# Patient Record
Sex: MALE | Race: WHITE | NOT HISPANIC OR LATINO | URBAN - METROPOLITAN AREA
[De-identification: names, ages, dates, MRNs, and addresses within clinical notes are randomized per-mention and may not be internally consistent; named-entity substitution may affect disease eponyms.]

---

## 2018-12-21 ENCOUNTER — EMERGENCY (EMERGENCY)
Facility: HOSPITAL | Age: 28
LOS: 0 days | Discharge: HOME | End: 2018-12-21
Attending: EMERGENCY MEDICINE | Admitting: EMERGENCY MEDICINE

## 2018-12-21 VITALS
OXYGEN SATURATION: 99 % | RESPIRATION RATE: 20 BRPM | HEART RATE: 115 BPM | WEIGHT: 184.97 LBS | DIASTOLIC BLOOD PRESSURE: 72 MMHG | SYSTOLIC BLOOD PRESSURE: 133 MMHG | HEIGHT: 70 IN | TEMPERATURE: 101 F

## 2018-12-21 VITALS
OXYGEN SATURATION: 95 % | SYSTOLIC BLOOD PRESSURE: 108 MMHG | DIASTOLIC BLOOD PRESSURE: 56 MMHG | RESPIRATION RATE: 18 BRPM | HEART RATE: 96 BPM | TEMPERATURE: 99 F

## 2018-12-21 DIAGNOSIS — R19.7 DIARRHEA, UNSPECIFIED: ICD-10-CM

## 2018-12-21 DIAGNOSIS — R50.9 FEVER, UNSPECIFIED: ICD-10-CM

## 2018-12-21 DIAGNOSIS — J45.909 UNSPECIFIED ASTHMA, UNCOMPLICATED: ICD-10-CM

## 2018-12-21 DIAGNOSIS — R10.9 UNSPECIFIED ABDOMINAL PAIN: ICD-10-CM

## 2018-12-21 DIAGNOSIS — R11.2 NAUSEA WITH VOMITING, UNSPECIFIED: ICD-10-CM

## 2018-12-21 LAB
ALBUMIN SERPL ELPH-MCNC: 3.5 G/DL — SIGNIFICANT CHANGE UP (ref 3.5–5.2)
ALP SERPL-CCNC: 42 U/L — SIGNIFICANT CHANGE UP (ref 30–115)
ALT FLD-CCNC: 16 U/L — SIGNIFICANT CHANGE UP (ref 0–41)
ANION GAP SERPL CALC-SCNC: 11 MMOL/L — SIGNIFICANT CHANGE UP (ref 7–14)
AST SERPL-CCNC: 13 U/L — SIGNIFICANT CHANGE UP (ref 0–41)
BASE EXCESS BLDV CALC-SCNC: 1 MMOL/L — SIGNIFICANT CHANGE UP (ref -2–2)
BILIRUB SERPL-MCNC: 0.7 MG/DL — SIGNIFICANT CHANGE UP (ref 0.2–1.2)
BUN SERPL-MCNC: 14 MG/DL — SIGNIFICANT CHANGE UP (ref 10–20)
CA-I SERPL-SCNC: 1.1 MMOL/L — LOW (ref 1.12–1.3)
CALCIUM SERPL-MCNC: 7.3 MG/DL — LOW (ref 8.5–10.1)
CHLORIDE SERPL-SCNC: 105 MMOL/L — SIGNIFICANT CHANGE UP (ref 98–110)
CO2 SERPL-SCNC: 23 MMOL/L — SIGNIFICANT CHANGE UP (ref 17–32)
CREAT SERPL-MCNC: 0.7 MG/DL — SIGNIFICANT CHANGE UP (ref 0.7–1.5)
GAS PNL BLDV: 136 MMOL/L — SIGNIFICANT CHANGE UP (ref 136–145)
GAS PNL BLDV: SIGNIFICANT CHANGE UP
GLUCOSE SERPL-MCNC: 98 MG/DL — SIGNIFICANT CHANGE UP (ref 70–99)
HCO3 BLDV-SCNC: 26 MMOL/L — SIGNIFICANT CHANGE UP (ref 22–29)
HCT VFR BLDA CALC: 45 % — HIGH (ref 34–44)
HGB BLD CALC-MCNC: 14.7 G/DL — SIGNIFICANT CHANGE UP (ref 14–18)
HOROWITZ INDEX BLDV+IHG-RTO: 21 — SIGNIFICANT CHANGE UP
LACTATE BLDV-MCNC: 1.5 MMOL/L — SIGNIFICANT CHANGE UP (ref 0.5–1.6)
LIDOCAIN IGE QN: 17 U/L — SIGNIFICANT CHANGE UP (ref 7–60)
PCO2 BLDV: 42 MMHG — SIGNIFICANT CHANGE UP (ref 41–51)
PH BLDV: 7.4 — SIGNIFICANT CHANGE UP (ref 7.26–7.43)
PO2 BLDV: 53 MMHG — HIGH (ref 20–40)
POTASSIUM BLDV-SCNC: 3.4 MMOL/L — SIGNIFICANT CHANGE UP (ref 3.3–5.6)
POTASSIUM SERPL-MCNC: 3.3 MMOL/L — LOW (ref 3.5–5)
POTASSIUM SERPL-SCNC: 3.3 MMOL/L — LOW (ref 3.5–5)
PROT SERPL-MCNC: 5.3 G/DL — LOW (ref 6–8)
SAO2 % BLDV: 87 % — SIGNIFICANT CHANGE UP
SODIUM SERPL-SCNC: 139 MMOL/L — SIGNIFICANT CHANGE UP (ref 135–146)

## 2018-12-21 RX ORDER — ONDANSETRON 8 MG/1
4 TABLET, FILM COATED ORAL ONCE
Qty: 0 | Refills: 0 | Status: COMPLETED | OUTPATIENT
Start: 2018-12-21 | End: 2018-12-21

## 2018-12-21 RX ORDER — FAMOTIDINE 10 MG/ML
20 INJECTION INTRAVENOUS ONCE
Qty: 0 | Refills: 0 | Status: COMPLETED | OUTPATIENT
Start: 2018-12-21 | End: 2018-12-21

## 2018-12-21 RX ORDER — KETOROLAC TROMETHAMINE 30 MG/ML
15 SYRINGE (ML) INJECTION ONCE
Qty: 0 | Refills: 0 | Status: DISCONTINUED | OUTPATIENT
Start: 2018-12-21 | End: 2018-12-21

## 2018-12-21 RX ORDER — ACETAMINOPHEN 500 MG
975 TABLET ORAL ONCE
Qty: 0 | Refills: 0 | Status: COMPLETED | OUTPATIENT
Start: 2018-12-21 | End: 2018-12-21

## 2018-12-21 RX ORDER — SODIUM CHLORIDE 9 MG/ML
2000 INJECTION, SOLUTION INTRAVENOUS ONCE
Qty: 0 | Refills: 0 | Status: COMPLETED | OUTPATIENT
Start: 2018-12-21 | End: 2018-12-21

## 2018-12-21 RX ORDER — ONDANSETRON 8 MG/1
1 TABLET, FILM COATED ORAL
Qty: 6 | Refills: 0 | OUTPATIENT
Start: 2018-12-21 | End: 2018-12-22

## 2018-12-21 RX ADMIN — ONDANSETRON 4 MILLIGRAM(S): 8 TABLET, FILM COATED ORAL at 15:02

## 2018-12-21 RX ADMIN — SODIUM CHLORIDE 6000 MILLILITER(S): 9 INJECTION, SOLUTION INTRAVENOUS at 15:02

## 2018-12-21 RX ADMIN — FAMOTIDINE 100 MILLIGRAM(S): 10 INJECTION INTRAVENOUS at 15:03

## 2018-12-21 RX ADMIN — Medication 975 MILLIGRAM(S): at 15:03

## 2018-12-21 RX ADMIN — Medication 15 MILLIGRAM(S): at 15:02

## 2018-12-21 NOTE — ED PROVIDER NOTE - ATTENDING CONTRIBUTION TO CARE
I personally evaluated patient. I agree with the findings and plan with all documentation on chart except as documented  in my note.    29 yo male with no significant pmh presents to the ED c/o nausea, vomiting and diarrhea since last night. Associated with diffuse abdominal cramping, body aches/malaise decreased PO and fever. Patient went to urgent care, had blood work (showed wbc of 14), given 2 liters of saline, IM Reglan with minimal improvement so was sent to the ED for further evaluation. Denies headache, neck pain/stiffness, visual changes, chest pain, sob, cough, URI sxs, urinary sxs, flank pain. no h/o abd surgery.  Patient has a benign abdominal exam.  Labs sent and K+ slightly low.  Patient will supplement with diet.  He feels great and is tolerating PO.  No pain or diarrhea in the ED.    Full DC instructions discussed and patient knows when to seek immediate medical attention.  Patient has proper follow up.  All results discussed and patient aware they may require further work up.  Proper follow up ensured. Limitations of ED work up discussed.  Medications administered and prescribed/OTC home meds discussed.  All questions and concerns from patient or family addressed. Understanding of instructions verbalized.

## 2018-12-21 NOTE — ED PROVIDER NOTE - NSFOLLOWUPINSTRUCTIONS_ED_ALL_ED_FT
Follow up with your primary care doctor.    Nausea / Vomiting    Nausea is the feeling that you have to vomit. As nausea gets worse, it can lead to vomiting. Vomiting puts you at an increased risk for dehydration. Older adults and people with other diseases or a weak immune system are at higher risk for dehydration. Drink clear fluids in small but frequent amounts as tolerated. Eat bland, easy-to-digest foods in small amounts as tolerated.    SEEK IMMEDIATE MEDICAL CARE IF YOU HAVE ANY OF THE FOLLOWING SYMPTOMS: fever, inability to keep sufficient fluids down, black or bloody vomitus, black or bloody stools, lightheadedness/dizziness, chest pain, severe headache, rash, shortness of breath, cold or clammy skin, confusion, pain with urination, or any signs of dehydration.    Diarrhea    Diarrhea is frequent loose or watery bowel movements that has many causes. Diarrhea can make you feel weak and cause you to become dehydrated. Diarrhea typically lasts 2–3 days, but can last longer if it is a sign of something more serious. Drink clear fluids to prevent dehydration. Eat bland, easy-to-digest foods as tolerated.     SEEK IMMEDIATE MEDICAL CARE IF YOU HAVE ANY OF THE FOLLOWING SYMPTOMS: high fevers, lightheadedness/dizziness, chest pain, black or bloody stools, shortness of breath, severe abdominal or back pain, or any signs of dehydration.

## 2018-12-21 NOTE — ED PROVIDER NOTE - NS ED ROS FT
Constitutional: + fever, + chills, + body aches  Cardiovascular: no chest pain, no sob, no syncope  Respiratory: no cough, no shortness of breath  Gastrointestinal: see HPI  :  no urinary sxs, no flank pain.  Integumentary: no rash or skin changes. no edema  Neurological: no headache, no dizziness, no visual changes, no UE/LE weakness or paresthesias. no change in mental status. no neck pain or stiffness.

## 2018-12-21 NOTE — ED PROVIDER NOTE - PHYSICAL EXAMINATION
GENERAL:  well appearing, non-toxic male in no acute distress  SKIN: skin warm, pink and dry. MMM. no rash. no pallor or diaphoresis  NECK: Neck supple. No midline cervical tenderness, meningismus, or JVD. Trachea midline  PULM: CTAB. Normal respiratory effort. No respiratory distress. No wheezes, stridor, rales or rhonchi. No retractions  CV: RRR, no M/R/G.   ABD: Soft, non-tender, non-distended. no rebound or guarding. no CVA tenderness.   NEURO: A+Ox3, no sensory/motor deficits

## 2018-12-21 NOTE — ED PROVIDER NOTE - MEDICAL DECISION MAKING DETAILS
27 yo male with no significant pmh presents to the ED c/o nausea, vomiting and diarrhea since last night. Associated with diffuse abdominal cramping, body aches/malaise decreased PO and fever. Patient went to urgent care, had blood work (showed wbc of 14), given 2 liters of saline, IM Reglan with minimal improvement so was sent to the ED for further evaluation. Denies headache, neck pain/stiffness, visual changes, chest pain, sob, cough, URI sxs, urinary sxs, flank pain. no h/o abd surgery.  Patient has a benign abdominal exam.  Labs sent and K+ slightly low.  Patient will supplement with diet.  He feels great and is tolerating PO.  No pain or diarrhea in the ED.    Full DC instructions discussed and patient knows when to seek immediate medical attention.  Patient has proper follow up.  All results discussed and patient aware they may require further work up.  Proper follow up ensured. Limitations of ED work up discussed.  Medications administered and prescribed/OTC home meds discussed.  All questions and concerns from patient or family addressed. Understanding of instructions verbalized.

## 2018-12-21 NOTE — ED ADULT NURSE NOTE - NSIMPLEMENTINTERV_GEN_ALL_ED
Implemented All Universal Safety Interventions:  Groom to call system. Call bell, personal items and telephone within reach. Instruct patient to call for assistance. Room bathroom lighting operational. Non-slip footwear when patient is off stretcher. Physically safe environment: no spills, clutter or unnecessary equipment. Stretcher in lowest position, wheels locked, appropriate side rails in place.

## 2018-12-21 NOTE — ED PROVIDER NOTE - PROGRESS NOTE DETAILS
Patient feeling significantly better, tolerating PO, eager to go home. Results discussed with patient. Understands return precautions.

## 2018-12-21 NOTE — ED ADULT NURSE NOTE - CHIEF COMPLAINT QUOTE
pt c/o n/v/d since last night, fever/chills, sent by ambulance from urgent care, pt had labs and ua done, elevated WBC.

## 2021-10-19 NOTE — ED ADULT NURSE NOTE - PMH
HR=34 bpm, RVWZ=283/61 mmhg, SpO2=97.0 %, Resp=18 B/min, EtCO2=31 mmHg, Apnea=4 Seconds Asthma    Pancreatitis

## 2023-01-03 NOTE — ED ADULT TRIAGE NOTE - CHIEF COMPLAINT QUOTE
[FreeTextEntry1] : 66-year-old female who has chronic pain s/p a work injury. She is medically managed at our office. I have refilled her Tramadol today. I will see the patient back at our office in 2 months for routine care. pt c/o n/v/d since last night, fever/chills, sent by ambulance from urgent care, pt had labs and ua done, elevated WBC.

## 2023-09-11 NOTE — ED ADULT TRIAGE NOTE - NS ED NURSE DIRECT TO ROOM YN
Eating Goals are:  1. Fluids about 64oz/day non carbonated, sugar free fluids, water, flavored water, protein water, broths, protein shakes. Avoid caffeine at this time as it may cause further dehydration.  2. Protein about  grams/day, full liquids, thickness that still can be poured.  3. Vitamins/Supplements  Important Vitamin and Minerals Following Gastric Bypass or Sleeve Gastrectomy Surgery:    Absorption of certain vitamins and minerals may change after having gastric bypass or sleeve gastrectomy surgery.  The risk of deficiency is more common for the following nutrients.  Malabsorption blood work will be monitored after your surgery routinely.  Recommendations for vitamin and mineral supplementation are for life.    -Immediately following surgery of if you have trouble with taking pills, DO NOT take all pills at the same time.   -It is recommended that you take your pills spaced out with sips of water.   -Please follow the dietician recommendation for spacing out vitamins and supplements for best absorption.    Multivitamin with Minerals ([place]er Chewable with IRON NOT gummy vitamin)  Take 2 times the adult dose daily, take 12hrs apart.   Multivitamins should contain 18 mg of iron (100% of Daily Value).  Iron makes up a part of red blood cells and is needed to carry oxygen in the blood.  Low levels of iron may result in anemia.    Deficiency symptoms:  fatigue and slow would healing.    Calcium Citrate (chewable form or liquid)  Take 1689-0329 mg daily.  Avoid taking more than a 600 mg dose at a time.  Calcium Carbonate is not recommended because it is less absorbed with these surgery types.  Calcium helps the body build and maintain strong bones.  Poor calcium intake can make your bones weak and breakable.  It also helps maintain blood pressure and regulate heartbeat and muscle contractions.    Vitamin D3 (in tablet form)  2000 International Units cholecalciferol daily   or 5000 International Units  every other day  If your Vit D levels is below normal, you will be given a prescription dose of VIT D called Ergocalciferol which will be taken for 12 weeks.  This provides your daily needs, helps build bones, strengthens the immune system, and lowers the risk of diabetes, heart disease, kidney disease, high blood pressure, and cancer.    Vitamin B12 (Sublingual-under the tongue, or shot if you have been prescribed by another provider)  500 mcg sublingual B12 every day  1000 mcg sublingual B12 every other day  If you receive Vit B12 injections monthly, you do not need to take pill form.  B12 helps the body use fats and proteins.  It also works with folate to make red blood cells and is needed to nerve impulse transmission.  After gastric bypass or sleeve gastrectomy, B12 is not absorbed well in the stomach, so additional B12 that enters directly into the blood is needed.  Low levels of vitamin B12 may result in fatigue, permanent nerve damage or anemia.        Deficiency symptoms:  Fatigue, skin pallor, shortness of breath, loss of appetite, disorientation, tingling in the extremities. Deficiency side effects can be irreversible.    Follow up plan for bariatric patients following surgery:    1 week postop: Nurse Practitioner and Dietician   4-6 weeks postop: Nurse Practitioner and Dietician   3 months postop: Nurse Practitioner and Dietician  6 months postop: Nurse Practitioner, Dietician, fasting labs and Psychologist (between 3-6 months)  1 year postop: Nurse Practitioner and Dietician (+ fasting labs)   18 months: Nurse Practitioner and Dietician (+fasting labs), Psychologist as needed.  Yearly: Nurse Practitioner and Dietician (+fasting labs yearly)    Yes